# Patient Record
Sex: FEMALE | Race: BLACK OR AFRICAN AMERICAN
[De-identification: names, ages, dates, MRNs, and addresses within clinical notes are randomized per-mention and may not be internally consistent; named-entity substitution may affect disease eponyms.]

---

## 2019-06-03 ENCOUNTER — HOSPITAL ENCOUNTER (EMERGENCY)
Dept: HOSPITAL 61 - ER | Age: 19
Discharge: HOME | End: 2019-06-03
Payer: SELF-PAY

## 2019-06-03 VITALS — HEIGHT: 64 IN | WEIGHT: 132 LBS | BODY MASS INDEX: 22.53 KG/M2

## 2019-06-03 VITALS — DIASTOLIC BLOOD PRESSURE: 59 MMHG | SYSTOLIC BLOOD PRESSURE: 110 MMHG

## 2019-06-03 DIAGNOSIS — R10.11: ICD-10-CM

## 2019-06-03 DIAGNOSIS — R10.13: ICD-10-CM

## 2019-06-03 DIAGNOSIS — Z3A.14: ICD-10-CM

## 2019-06-03 DIAGNOSIS — O21.8: Primary | ICD-10-CM

## 2019-06-03 LAB
ALBUMIN SERPL-MCNC: 3.1 G/DL (ref 3.4–5)
ALBUMIN/GLOB SERPL: 0.7 {RATIO} (ref 1–1.7)
ALP SERPL-CCNC: 45 U/L (ref 46–116)
ALT SERPL-CCNC: 22 U/L (ref 14–59)
ANION GAP SERPL CALC-SCNC: 11 MMOL/L (ref 6–14)
AST SERPL-CCNC: 18 U/L (ref 15–37)
BASOPHILS # BLD AUTO: 0 X10^3/UL (ref 0–0.2)
BASOPHILS NFR BLD: 0 % (ref 0–3)
BILIRUB SERPL-MCNC: 0.1 MG/DL (ref 0.2–1)
BUN SERPL-MCNC: 11 MG/DL (ref 7–20)
BUN/CREAT SERPL: 16 (ref 6–20)
CALCIUM SERPL-MCNC: 9.2 MG/DL (ref 8.5–10.1)
CHLORIDE SERPL-SCNC: 102 MMOL/L (ref 98–107)
CO2 SERPL-SCNC: 26 MMOL/L (ref 21–32)
CREAT SERPL-MCNC: 0.7 MG/DL (ref 0.6–1)
EOSINOPHIL NFR BLD: 0.1 X10^3/UL (ref 0–0.7)
EOSINOPHIL NFR BLD: 1 % (ref 0–3)
ERYTHROCYTE [DISTWIDTH] IN BLOOD BY AUTOMATED COUNT: 14 % (ref 11.5–14.5)
GFR SERPLBLD BASED ON 1.73 SQ M-ARVRAT: 106.7 ML/MIN
GLOBULIN SER-MCNC: 4.2 G/DL (ref 2.2–3.8)
GLUCOSE SERPL-MCNC: 124 MG/DL (ref 70–99)
HCT VFR BLD CALC: 35.3 % (ref 36–47)
HGB BLD-MCNC: 11.7 G/DL (ref 12–15.5)
LIPASE: 170 U/L (ref 73–393)
LYMPHOCYTES # BLD: 3.2 X10^3/UL (ref 1–4.8)
LYMPHOCYTES NFR BLD AUTO: 34 % (ref 24–48)
MCH RBC QN AUTO: 30 PG (ref 25–35)
MCHC RBC AUTO-ENTMCNC: 33 G/DL (ref 31–37)
MCV RBC AUTO: 92 FL (ref 79–100)
MONO #: 0.7 X10^3/UL (ref 0–1.1)
MONOCYTES NFR BLD: 7 % (ref 0–9)
NEUT #: 5.4 X10^3UL (ref 1.8–7.7)
NEUTROPHILS NFR BLD AUTO: 58 % (ref 31–73)
PLATELET # BLD AUTO: 201 X10^3/UL (ref 140–400)
POTASSIUM SERPL-SCNC: 3.9 MMOL/L (ref 3.5–5.1)
PROT SERPL-MCNC: 7.3 G/DL (ref 6.4–8.2)
RBC # BLD AUTO: 3.85 X10^6/UL (ref 3.5–5.4)
SODIUM SERPL-SCNC: 139 MMOL/L (ref 136–145)
WBC # BLD AUTO: 9.3 X10^3/UL (ref 4–11)

## 2019-06-03 PROCEDURE — 76815 OB US LIMITED FETUS(S): CPT

## 2019-06-03 PROCEDURE — 99285 EMERGENCY DEPT VISIT HI MDM: CPT

## 2019-06-03 PROCEDURE — 83690 ASSAY OF LIPASE: CPT

## 2019-06-03 PROCEDURE — 96374 THER/PROPH/DIAG INJ IV PUSH: CPT

## 2019-06-03 PROCEDURE — 36415 COLL VENOUS BLD VENIPUNCTURE: CPT

## 2019-06-03 PROCEDURE — 80053 COMPREHEN METABOLIC PANEL: CPT

## 2019-06-03 PROCEDURE — 76705 ECHO EXAM OF ABDOMEN: CPT

## 2019-06-03 PROCEDURE — 85025 COMPLETE CBC W/AUTO DIFF WBC: CPT

## 2019-06-03 PROCEDURE — 96361 HYDRATE IV INFUSION ADD-ON: CPT

## 2019-06-03 NOTE — PHYS DOC
Past Medical History


Past Medical History:  No Pertinent History


 (JEROD DUMONT MD)


Past Surgical History:  No Surgical History


 (JEROD DUMONT MD)


Alcohol Use:  None


Drug Use:  None


 (JEROD DUMONT MD)





Adult General


Chief Complaint


Chief Complaint:  ABDOMINAL PAIN IN PREGNANCY





HPI


HPI





Patient is a 18  year old  f p/w ruq /epigastric pain.radiates to chest


went to [facility] had neg PE study on friday.


pain got better, it came back tonight after eating a gyro. she threw it up pain 

now severe sharp radiates to back as well.


pt is 14 weeks pregannt, no lower abdo pain, no vaginal bleeding or discharge.


no fever


PT had neg u/a at the outside hospital as well.


 (JEROD DUMONT MD)





Review of Systems


Review of Systems





Constitutional: Denies fever or chills []


Eyes: Denies change in visual acuity, redness, or eye pain []


HENT: Denies nasal congestion or sore throat []


Respiratory: Denies cough or shortness of breath []


Cardiovascular: No additional information not addressed in HPI []





Integument: Denies rash or skin lesions []


Neurologic: Denies headache, focal weakness or sensory changes []


Endocrine: Denies polyuria or polydipsia []





All other systems were reviewed and found to be within normal limits, except as 

documented in this note.


 (JEROD UDMONT MD)





Current Medications


Current Medications





Current Medications








 Medications


  (Trade)  Dose


 Ordered  Sig/Diana  Start Time


 Stop Time Status Last Admin


Dose Admin


 


 Fentanyl Citrate


  (Fentanyl 2ml


 Vial)  50 mcg  1X  ONCE  6/3/19 04:45


 6/3/19 04:46 DC 6/3/19 05:06


50 MCG


 


 Sodium Chloride  1,000 ml @ 


 1,000 mls/hr  1X  ONCE  6/3/19 05:00


 6/3/19 05:59 DC 6/3/19 05:06


1,000 MLS/HR





 (VLAD BOCANEGRA MD)





Allergies


Allergies





Allergies








Coded Allergies Type Severity Reaction Last Updated Verified


 


  No Known Drug Allergies    6/3/19 No





 (VLAD BOCANEGRA MD)





Physical Exam


Physical Exam





Constitutional: Well developed, well nourished,moderate distress, pt is sweaty 

and appears uncomfortable


HENT: Normocephalic, atraumatic, bilateral external ears normal, oropharynx 

moist, no oral exudates, nose normal. []


Eyes: PERRLA, EOMI, conjunctiva normal, no discharge. [] 


Neck: Normal range of motion, no tenderness, supple, no stridor. [] 


Cardiovascular:Heart rate regular rhythm, no murmur []


Lungs & Thorax:  Bilateral breath sounds clear to auscultation []


Abdomen: Bowel sounds normal, soft, ruq ttp and epigastric ttp


Skin: Warm, dry, no erythema, no rash. [] 


Back: No tenderness, no CVA tenderness. [] 


Extremities: No tenderness, no cyanosis, no clubbing, ROM intact, no edema. [] 


Neurologic: Alert and oriented X 3, normal motor function, normal sensory 

function, no focal deficits noted. []


Psychologic: Affect normal, judgement normal, mood normal. []


 (JEROD DUMONT MD)





Current Patient Data


Vital Signs





                                   Vital Signs








  Date Time  Temp Pulse Resp B/P (MAP) Pulse Ox O2 Delivery O2 Flow Rate FiO2


 


6/3/19 05:43  60  107/58 (74) 100 Room Air  


 


6/3/19 04:03 97.4  15     





 97.4       





 (VLAD BOCANEGRA MD)


Lab Values





                                Laboratory Tests








Test


 6/3/19


04:35


 


White Blood Count


 9.3 x10^3/uL


(4.0-11.0)


 


Red Blood Count


 3.85 x10^6/uL


(3.50-5.40)


 


Hemoglobin


 11.7 g/dL


(12.0-15.5)  L


 


Hematocrit


 35.3 %


(36.0-47.0)  L


 


Mean Corpuscular Volume


 92 fL ()





 


Mean Corpuscular Hemoglobin 30 pg (25-35)  


 


Mean Corpuscular Hemoglobin


Concent 33 g/dL


(31-37)


 


Red Cell Distribution Width


 14.0 %


(11.5-14.5)


 


Platelet Count


 201 x10^3/uL


(140-400)


 


Neutrophils (%) (Auto) 58 % (31-73)  


 


Lymphocytes (%) (Auto) 34 % (24-48)  


 


Monocytes (%) (Auto) 7 % (0-9)  


 


Eosinophils (%) (Auto) 1 % (0-3)  


 


Basophils (%) (Auto) 0 % (0-3)  


 


Neutrophils # (Auto)


 5.4 x10^3uL


(1.8-7.7)


 


Lymphocytes # (Auto)


 3.2 x10^3/uL


(1.0-4.8)


 


Monocytes # (Auto)


 0.7 x10^3/uL


(0.0-1.1)


 


Eosinophils # (Auto)


 0.1 x10^3/uL


(0.0-0.7)


 


Basophils # (Auto)


 0.0 x10^3/uL


(0.0-0.2)


 


Sodium Level


 139 mmol/L


(136-145)


 


Potassium Level


 3.9 mmol/L


(3.5-5.1)


 


Chloride Level


 102 mmol/L


()


 


Carbon Dioxide Level


 26 mmol/L


(21-32)


 


Anion Gap 11 (6-14)  


 


Blood Urea Nitrogen


 11 mg/dL


(7-20)


 


Creatinine


 0.7 mg/dL


(0.6-1.0)


 


Estimated GFR


(Cockcroft-Gault) 106.7  





 


BUN/Creatinine Ratio 16 (6-20)  


 


Glucose Level


 124 mg/dL


(70-99)  H


 


Calcium Level


 9.2 mg/dL


(8.5-10.1)


 


Total Bilirubin


 0.1 mg/dL


(0.2-1.0)  L


 


Aspartate Amino Transferase


(AST) 18 U/L (15-37)





 


Alanine Aminotransferase (ALT)


 22 U/L (14-59)





 


Alkaline Phosphatase


 45 U/L


()  L


 


Total Protein


 7.3 g/dL


(6.4-8.2)


 


Albumin


 3.1 g/dL


(3.4-5.0)  L


 


Albumin/Globulin Ratio


 0.7 (1.0-1.7)


L


 


Lipase


 170 U/L


()





                                Laboratory Tests


6/3/19 04:35








                                Laboratory Tests


6/3/19 04:35








 (VLAD BOCANEGRA MD)





EKG


EKG


[]


 (JEROD DUMONT MD)





Radiology/Procedures


Radiology/Procedures


[]


 (JEROD DUMONT MD)





Course & Med Decision Making


Course & Med Decision Making


Pertinent Labs and Imaging studies reviewed. (See chart for details)





[]17 yo f 14 weeks pregnant p/w ruq pain. had neg PE study at outside hosptial


eval for biliary process.


u/s pelvis: shows intrauterine pregnancy estimated gestational age of 14 weeks 

and 3 days positive heartbeat. Maternal ovaries are obscured.





Lab interpretation looked essentially normal





 (JEROD DUMONT MD)


Course & Med Decision Making


Abdominal ultrasound reported normal gallbladder and intrauterine pregnancy at 

14 weeks and 2 days without abnormal finding. Patient felt better after 

treatment given earlier in ER. Plan discharge patient home with diagnosis of 

abdominal pain in pregnancy and nausea and vomiting.


 (VLAD BOCANEGRA MD)


Dragon Disclaimer


Dragon Disclaimer


This electronic medical record was generated, in whole or in part, using a voice

 recognition dictation system.


 (JEROD DUMONT MD)





Departure


Departure


Impression:  


   Primary Impression:  


   Abdominal pain during pregnancy


   Additional Impression:  


   Nausea and vomiting


Disposition:  01 HOME, SELF-CARE (At 0617)


Condition:  IMPROVED


Referrals:  


NO PCP (PCP)


Patient Instructions:  Abdominal Pain During Pregnancy, Diet - Hyperemesis 

Gravidarum, Hyperemesis Gravidarum





Additional Instructions:  


Drink plenty of liquids


Follow-up with your primary care physician in 3-5 days


Return to ER if not getting better


Scripts


Ondansetron Hcl (ZOFRAN) 4 Mg Tablet


1 TAB PO PRN Q6-8HRS for nausea, #12 TAB


   Prov: VLAD BOCANEGRA MD         6/3/19





Problem Qualifiers








   Primary Impression:  


   Abdominal pain during pregnancy


   Trimester:  second trimester  Qualified Codes:  O26.892 - Other specified 

   pregnancy related conditions, second trimester; R10.9 - Unspecified abdominal

    pain


   Additional Impression:  


   Nausea and vomiting


   Vomiting type:  unspecified  Vomiting Intractability:  unspecified  Qualified

    Codes:  R11.2 - Nausea with vomiting, unspecified








JEROD DUMONT MD             Ross 3, 2019 05:32


VLAD BOCANEGRA MD              Ross 3, 2019 06:18

## 2019-06-03 NOTE — RAD
Vicky Arredondontosh

INDICATION : Right upper quadrant pain

 

COMPARISON: None

 

TECHNIQUE: Multiple ultrasound images obtained through the abdomen in 

grayscale and color.

 

FINDINGS:

 

Liver: Mildly echogenic. This is a questionable finding but mild fatty 

infiltration not excluded.

Gallbladder: Questionable minimal internal echoes dependently. Could be 

artifactual in nature but gallbladder sludge or tiny stones would be 

difficult to exclude given this finding. There is no evidence of large 

gallstones or gallbladder wall thickening.

IVC: Partially distended at level of liver.

Common Bile Duct: 2 mm.

Pancreas: Only partially seen without definite adjacent fluid collection.

Right Kidney:  No hydronephrosis.

 

IMPRESSION:

 

1. No evidence of right-sided hydronephrosis or dilation of the common 

bile duct.

 

Electronically signed by: Jose Rodriguez MD (6/3/2019 11:47 PM) [id number]